# Patient Record
Sex: FEMALE | Race: ASIAN
[De-identification: names, ages, dates, MRNs, and addresses within clinical notes are randomized per-mention and may not be internally consistent; named-entity substitution may affect disease eponyms.]

---

## 2018-03-01 ENCOUNTER — HOSPITAL ENCOUNTER (OUTPATIENT)
Dept: HOSPITAL 8 - RAD | Age: 73
Discharge: HOME | End: 2018-03-01
Attending: INTERNAL MEDICINE
Payer: COMMERCIAL

## 2018-03-01 DIAGNOSIS — R06.02: Primary | ICD-10-CM

## 2018-03-01 PROCEDURE — 78452 HT MUSCLE IMAGE SPECT MULT: CPT

## 2018-03-01 PROCEDURE — A9502 TC99M TETROFOSMIN: HCPCS

## 2018-03-01 PROCEDURE — 93017 CV STRESS TEST TRACING ONLY: CPT

## 2019-10-21 ENCOUNTER — HOSPITAL ENCOUNTER (INPATIENT)
Dept: HOSPITAL 8 - ED | Age: 74
LOS: 6 days | Discharge: HOME | DRG: 300 | End: 2019-10-27
Attending: INTERNAL MEDICINE | Admitting: INTERNAL MEDICINE
Payer: COMMERCIAL

## 2019-10-21 VITALS — BODY MASS INDEX: 28.78 KG/M2 | HEIGHT: 57 IN | WEIGHT: 133.38 LBS

## 2019-10-21 VITALS — DIASTOLIC BLOOD PRESSURE: 74 MMHG | SYSTOLIC BLOOD PRESSURE: 125 MMHG

## 2019-10-21 DIAGNOSIS — Z82.3: ICD-10-CM

## 2019-10-21 DIAGNOSIS — L03.115: ICD-10-CM

## 2019-10-21 DIAGNOSIS — M81.0: ICD-10-CM

## 2019-10-21 DIAGNOSIS — D75.1: ICD-10-CM

## 2019-10-21 DIAGNOSIS — D75.89: ICD-10-CM

## 2019-10-21 DIAGNOSIS — I73.9: Primary | ICD-10-CM

## 2019-10-21 DIAGNOSIS — L97.919: ICD-10-CM

## 2019-10-21 DIAGNOSIS — I10: ICD-10-CM

## 2019-10-21 DIAGNOSIS — L97.929: ICD-10-CM

## 2019-10-21 DIAGNOSIS — I82.531: ICD-10-CM

## 2019-10-21 DIAGNOSIS — R00.0: ICD-10-CM

## 2019-10-21 DIAGNOSIS — Z87.442: ICD-10-CM

## 2019-10-21 DIAGNOSIS — Z88.8: ICD-10-CM

## 2019-10-21 LAB
ALBUMIN SERPL-MCNC: 3.3 G/DL (ref 3.4–5)
ALP SERPL-CCNC: 56 U/L (ref 45–117)
ALT SERPL-CCNC: 30 U/L (ref 12–78)
ANION GAP SERPL CALC-SCNC: 5 MMOL/L (ref 5–15)
BASOPHILS # BLD AUTO: 0.04 X10^3/UL (ref 0–0.1)
BASOPHILS NFR BLD AUTO: 1 % (ref 0–1)
BILIRUB SERPL-MCNC: 0.6 MG/DL (ref 0.2–1)
CALCIUM SERPL-MCNC: 9.5 MG/DL (ref 8.5–10.1)
CHLORIDE SERPL-SCNC: 104 MMOL/L (ref 98–107)
CREAT SERPL-MCNC: 0.95 MG/DL (ref 0.55–1.02)
EOSINOPHIL # BLD AUTO: 0.97 X10^3/UL (ref 0–0.4)
EOSINOPHIL NFR BLD AUTO: 11 % (ref 1–7)
ERYTHROCYTE [DISTWIDTH] IN BLOOD BY AUTOMATED COUNT: 13.9 % (ref 9.6–15.2)
LYMPHOCYTES # BLD AUTO: 1.33 X10^3/UL (ref 1–3.4)
LYMPHOCYTES NFR BLD AUTO: 15 % (ref 22–44)
MCH RBC QN AUTO: 39.6 PG (ref 27–34.8)
MCHC RBC AUTO-ENTMCNC: 33.2 G/DL (ref 32.4–35.8)
MCV RBC AUTO: 119 FL (ref 80–100)
MD: (no result)
MONOCYTES # BLD AUTO: 0.65 X10^3/UL (ref 0.2–0.8)
MONOCYTES NFR BLD AUTO: 8 % (ref 2–9)
NEUTROPHILS # BLD AUTO: 5.72 X10^3/UL (ref 1.8–6.8)
NEUTROPHILS NFR BLD AUTO: 66 % (ref 42–75)
PLATELET # BLD AUTO: 465 X10^3/UL (ref 130–400)
PMV BLD AUTO: 6.7 FL (ref 7.4–10.4)
PROT SERPL-MCNC: 7.8 G/DL (ref 6.4–8.2)
RBC # BLD AUTO: 3.34 X10^6/UL (ref 3.82–5.3)

## 2019-10-21 PROCEDURE — 87040 BLOOD CULTURE FOR BACTERIA: CPT

## 2019-10-21 PROCEDURE — 85025 COMPLETE CBC W/AUTO DIFF WBC: CPT

## 2019-10-21 PROCEDURE — 87186 SC STD MICRODIL/AGAR DIL: CPT

## 2019-10-21 PROCEDURE — A9575 INJ GADOTERATE MEGLUMI 0.1ML: HCPCS

## 2019-10-21 PROCEDURE — 87070 CULTURE OTHR SPECIMN AEROBIC: CPT

## 2019-10-21 PROCEDURE — 80053 COMPREHEN METABOLIC PANEL: CPT

## 2019-10-21 PROCEDURE — 83605 ASSAY OF LACTIC ACID: CPT

## 2019-10-21 PROCEDURE — 80048 BASIC METABOLIC PNL TOTAL CA: CPT

## 2019-10-21 PROCEDURE — 87205 SMEAR GRAM STAIN: CPT

## 2019-10-21 PROCEDURE — 93005 ELECTROCARDIOGRAM TRACING: CPT

## 2019-10-21 PROCEDURE — 93922 UPR/L XTREMITY ART 2 LEVELS: CPT

## 2019-10-21 PROCEDURE — 87077 CULTURE AEROBIC IDENTIFY: CPT

## 2019-10-21 PROCEDURE — 93970 EXTREMITY STUDY: CPT

## 2019-10-21 PROCEDURE — 99285 EMERGENCY DEPT VISIT HI MDM: CPT

## 2019-10-21 PROCEDURE — 36415 COLL VENOUS BLD VENIPUNCTURE: CPT

## 2019-10-21 PROCEDURE — 84145 PROCALCITONIN (PCT): CPT

## 2019-10-21 NOTE — NUR
PT WENT TO [facility] FOR [facility] IN [facility] X1 MONTH, PT WAS SENT HERE TO [facility] FOR POSSIBLE 
DVT. PER PT, NO TESTING WAS DONE. RASHES ON BILAT HEALS THAT ARE ITCHY AND SHE 
SCRATCHES THEM AND BECOMES OPEN AREAS.  COVERED WOUNDS, DRESSING CDI. 
CONNECTED TO ALL MONITORING. CALL LIGHT IN REACH. DAUGHTER AT BEDSIDE.

## 2019-10-21 NOTE — NUR
BLOOD CULTURES X 2 DRAWN BY THIS RN. VENOUS LACTATE REQUESTED FROM PROVIDER. 
PROVIDER AGGREABLE TO THIS. PT HAD PERIPHERAL STICKS X 2 AT THIS TIME. PT 
REPORTING PAIN ONLY SLIGHTLY BETTER. ABX TO BE STARTED AFTER CULTURES.

## 2019-10-22 VITALS — SYSTOLIC BLOOD PRESSURE: 127 MMHG | DIASTOLIC BLOOD PRESSURE: 73 MMHG

## 2019-10-22 VITALS — SYSTOLIC BLOOD PRESSURE: 116 MMHG | DIASTOLIC BLOOD PRESSURE: 71 MMHG

## 2019-10-22 VITALS — DIASTOLIC BLOOD PRESSURE: 68 MMHG | SYSTOLIC BLOOD PRESSURE: 109 MMHG

## 2019-10-22 VITALS — DIASTOLIC BLOOD PRESSURE: 74 MMHG | SYSTOLIC BLOOD PRESSURE: 125 MMHG

## 2019-10-22 RX ADMIN — AMPICILLIN SODIUM AND SULBACTAM SODIUM SCH MLS/HR: 2; 1 INJECTION, POWDER, FOR SOLUTION INTRAMUSCULAR; INTRAVENOUS at 23:22

## 2019-10-22 RX ADMIN — AMPICILLIN SODIUM AND SULBACTAM SODIUM SCH MLS/HR: 2; 1 INJECTION, POWDER, FOR SOLUTION INTRAMUSCULAR; INTRAVENOUS at 05:05

## 2019-10-22 RX ADMIN — AMPICILLIN SODIUM AND SULBACTAM SODIUM SCH MLS/HR: 2; 1 INJECTION, POWDER, FOR SOLUTION INTRAMUSCULAR; INTRAVENOUS at 16:56

## 2019-10-22 RX ADMIN — GABAPENTIN PRN MG: 300 CAPSULE ORAL at 16:56

## 2019-10-22 RX ADMIN — AMPICILLIN SODIUM AND SULBACTAM SODIUM SCH MLS/HR: 2; 1 INJECTION, POWDER, FOR SOLUTION INTRAMUSCULAR; INTRAVENOUS at 11:21

## 2019-10-22 RX ADMIN — ENOXAPARIN SODIUM SCH MG: 40 INJECTION SUBCUTANEOUS at 01:12

## 2019-10-22 RX ADMIN — ATORVASTATIN CALCIUM SCH MG: 20 TABLET, FILM COATED ORAL at 20:12

## 2019-10-23 VITALS — SYSTOLIC BLOOD PRESSURE: 116 MMHG | DIASTOLIC BLOOD PRESSURE: 68 MMHG

## 2019-10-23 VITALS — SYSTOLIC BLOOD PRESSURE: 117 MMHG | DIASTOLIC BLOOD PRESSURE: 77 MMHG

## 2019-10-23 VITALS — SYSTOLIC BLOOD PRESSURE: 121 MMHG | DIASTOLIC BLOOD PRESSURE: 79 MMHG

## 2019-10-23 VITALS — SYSTOLIC BLOOD PRESSURE: 122 MMHG | DIASTOLIC BLOOD PRESSURE: 69 MMHG

## 2019-10-23 VITALS — SYSTOLIC BLOOD PRESSURE: 130 MMHG | DIASTOLIC BLOOD PRESSURE: 75 MMHG

## 2019-10-23 LAB
<PLATELET ESTIMATE>: (no result)
<PLT MORPHOLOGY>: (no result)
ANION GAP SERPL CALC-SCNC: 4 MMOL/L (ref 5–15)
BASOPHILS NFR BLD MANUAL: 2 % (ref 0–1)
BASOS#(MANUAL): 0.17 X10^3/UL (ref 0–0.1)
CALCIUM SERPL-MCNC: 8.3 MG/DL (ref 8.5–10.1)
CHLORIDE SERPL-SCNC: 107 MMOL/L (ref 98–107)
CREAT SERPL-MCNC: 0.98 MG/DL (ref 0.55–1.02)
EOS#(MANUAL): 1.62 X10^3/UL (ref 0–0.4)
EOS% (MANUAL): 19 % (ref 1–7)
ERYTHROCYTE [DISTWIDTH] IN BLOOD BY AUTOMATED COUNT: 13.8 % (ref 9.6–15.2)
LYMPH#(MANUAL): 0.68 X10^3/UL (ref 1–3.4)
LYMPHS% (MANUAL): 8 % (ref 22–44)
MACROCYTES BLD QL SMEAR: (no result)
MCH RBC QN AUTO: 38.9 PG (ref 27–34.8)
MCHC RBC AUTO-ENTMCNC: 32.5 G/DL (ref 32.4–35.8)
MCV RBC AUTO: 119.7 FL (ref 80–100)
MD: YES
MONOS#(MANUAL): 0.51 X10^3/UL (ref 0.3–2.7)
MONOS% (MANUAL): 6 % (ref 2–9)
PLATELET # BLD AUTO: 482 X10^3/UL (ref 130–400)
PMV BLD AUTO: 6.3 FL (ref 7.4–10.4)
RBC # BLD AUTO: 3.33 X10^6/UL (ref 3.82–5.3)
SEG#(MANUAL): 5.53 X10^3/UL (ref 1.8–6.8)
SEGS% (MANUAL): 65 % (ref 42–75)

## 2019-10-23 RX ADMIN — AMPICILLIN SODIUM AND SULBACTAM SODIUM SCH MLS/HR: 2; 1 INJECTION, POWDER, FOR SOLUTION INTRAMUSCULAR; INTRAVENOUS at 11:15

## 2019-10-23 RX ADMIN — CLOPIDOGREL SCH MG: 75 TABLET, FILM COATED ORAL at 09:54

## 2019-10-23 RX ADMIN — GABAPENTIN PRN MG: 300 CAPSULE ORAL at 16:24

## 2019-10-23 RX ADMIN — GABAPENTIN PRN MG: 300 CAPSULE ORAL at 01:08

## 2019-10-23 RX ADMIN — ENOXAPARIN SODIUM SCH MG: 40 INJECTION SUBCUTANEOUS at 01:09

## 2019-10-23 RX ADMIN — AMPICILLIN SODIUM AND SULBACTAM SODIUM SCH MLS/HR: 2; 1 INJECTION, POWDER, FOR SOLUTION INTRAMUSCULAR; INTRAVENOUS at 16:24

## 2019-10-23 RX ADMIN — HYDROXYUREA SCH MG: 500 CAPSULE ORAL at 10:00

## 2019-10-23 RX ADMIN — GABAPENTIN PRN MG: 300 CAPSULE ORAL at 09:54

## 2019-10-23 RX ADMIN — AMPICILLIN SODIUM AND SULBACTAM SODIUM SCH MLS/HR: 2; 1 INJECTION, POWDER, FOR SOLUTION INTRAMUSCULAR; INTRAVENOUS at 23:17

## 2019-10-23 RX ADMIN — ACETAMINOPHEN PRN MG: 325 TABLET, FILM COATED ORAL at 14:26

## 2019-10-23 RX ADMIN — VITAMIN D, TAB 1000IU (100/BT) SCH UNITS: 25 TAB at 09:54

## 2019-10-23 RX ADMIN — KETOROLAC TROMETHAMINE PRN MG: 30 INJECTION, SOLUTION INTRAMUSCULAR at 20:40

## 2019-10-23 RX ADMIN — AMLODIPINE BESYLATE SCH MG: 5 TABLET ORAL at 09:54

## 2019-10-23 RX ADMIN — ATORVASTATIN CALCIUM SCH MG: 20 TABLET, FILM COATED ORAL at 20:52

## 2019-10-23 RX ADMIN — AMPICILLIN SODIUM AND SULBACTAM SODIUM SCH MLS/HR: 2; 1 INJECTION, POWDER, FOR SOLUTION INTRAMUSCULAR; INTRAVENOUS at 05:24

## 2019-10-24 VITALS — SYSTOLIC BLOOD PRESSURE: 115 MMHG | DIASTOLIC BLOOD PRESSURE: 52 MMHG

## 2019-10-24 VITALS — DIASTOLIC BLOOD PRESSURE: 73 MMHG | SYSTOLIC BLOOD PRESSURE: 117 MMHG

## 2019-10-24 VITALS — SYSTOLIC BLOOD PRESSURE: 119 MMHG | DIASTOLIC BLOOD PRESSURE: 45 MMHG

## 2019-10-24 VITALS — DIASTOLIC BLOOD PRESSURE: 64 MMHG | SYSTOLIC BLOOD PRESSURE: 120 MMHG

## 2019-10-24 LAB
ANION GAP SERPL CALC-SCNC: 3 MMOL/L (ref 5–15)
BASOPHILS # BLD AUTO: 0.05 X10^3/UL (ref 0–0.1)
BASOPHILS NFR BLD AUTO: 1 % (ref 0–1)
CALCIUM SERPL-MCNC: 7.9 MG/DL (ref 8.5–10.1)
CHLORIDE SERPL-SCNC: 110 MMOL/L (ref 98–107)
CREAT SERPL-MCNC: 1.28 MG/DL (ref 0.55–1.02)
EOSINOPHIL # BLD AUTO: 1.32 X10^3/UL (ref 0–0.4)
EOSINOPHIL NFR BLD AUTO: 18 % (ref 1–7)
ERYTHROCYTE [DISTWIDTH] IN BLOOD BY AUTOMATED COUNT: 13.6 % (ref 9.6–15.2)
LYMPHOCYTES # BLD AUTO: 1.12 X10^3/UL (ref 1–3.4)
LYMPHOCYTES NFR BLD AUTO: 16 % (ref 22–44)
MCH RBC QN AUTO: 38.8 PG (ref 27–34.8)
MCHC RBC AUTO-ENTMCNC: 32.3 G/DL (ref 32.4–35.8)
MCV RBC AUTO: 120.2 FL (ref 80–100)
MD: (no result)
MONOCYTES # BLD AUTO: 0.54 X10^3/UL (ref 0.2–0.8)
MONOCYTES NFR BLD AUTO: 8 % (ref 2–9)
NEUTROPHILS # BLD AUTO: 4.17 X10^3/UL (ref 1.8–6.8)
NEUTROPHILS NFR BLD AUTO: 58 % (ref 42–75)
PLATELET # BLD AUTO: 457 X10^3/UL (ref 130–400)
PMV BLD AUTO: 6.7 FL (ref 7.4–10.4)
RBC # BLD AUTO: 3.24 X10^6/UL (ref 3.82–5.3)

## 2019-10-24 RX ADMIN — HYDROXYUREA SCH MG: 500 CAPSULE ORAL at 09:23

## 2019-10-24 RX ADMIN — GABAPENTIN PRN MG: 300 CAPSULE ORAL at 18:08

## 2019-10-24 RX ADMIN — AMPICILLIN SODIUM AND SULBACTAM SODIUM SCH MLS/HR: 2; 1 INJECTION, POWDER, FOR SOLUTION INTRAMUSCULAR; INTRAVENOUS at 23:06

## 2019-10-24 RX ADMIN — DIPHENHYDRAMINE HYDROCHLORIDE, ZINC ACETATE PRN APPLIC: 2; .1 CREAM TOPICAL at 18:08

## 2019-10-24 RX ADMIN — AMPICILLIN SODIUM AND SULBACTAM SODIUM SCH MLS/HR: 2; 1 INJECTION, POWDER, FOR SOLUTION INTRAMUSCULAR; INTRAVENOUS at 05:20

## 2019-10-24 RX ADMIN — HYDROCORTISONE PRN APPLIC: 10 CREAM TOPICAL at 09:24

## 2019-10-24 RX ADMIN — HYDROCORTISONE PRN APPLIC: 10 CREAM TOPICAL at 18:08

## 2019-10-24 RX ADMIN — HYDROCORTISONE PRN APPLIC: 10 CREAM TOPICAL at 23:23

## 2019-10-24 RX ADMIN — CLOPIDOGREL SCH MG: 75 TABLET, FILM COATED ORAL at 09:24

## 2019-10-24 RX ADMIN — VITAMIN D, TAB 1000IU (100/BT) SCH UNITS: 25 TAB at 09:24

## 2019-10-24 RX ADMIN — AMLODIPINE BESYLATE SCH MG: 5 TABLET ORAL at 09:24

## 2019-10-24 RX ADMIN — GABAPENTIN PRN MG: 300 CAPSULE ORAL at 09:24

## 2019-10-24 RX ADMIN — GABAPENTIN PRN MG: 300 CAPSULE ORAL at 01:16

## 2019-10-24 RX ADMIN — ACETAMINOPHEN PRN MG: 325 TABLET, FILM COATED ORAL at 20:28

## 2019-10-24 RX ADMIN — DIPHENHYDRAMINE HYDROCHLORIDE, ZINC ACETATE PRN APPLIC: 2; .1 CREAM TOPICAL at 09:24

## 2019-10-24 RX ADMIN — LINEZOLID SCH MLS/HR: 600 INJECTION, SOLUTION INTRAVENOUS at 18:08

## 2019-10-24 RX ADMIN — AMPICILLIN SODIUM AND SULBACTAM SODIUM SCH MLS/HR: 2; 1 INJECTION, POWDER, FOR SOLUTION INTRAMUSCULAR; INTRAVENOUS at 11:35

## 2019-10-24 RX ADMIN — ATORVASTATIN CALCIUM SCH MG: 20 TABLET, FILM COATED ORAL at 20:28

## 2019-10-24 RX ADMIN — DIPHENHYDRAMINE HYDROCHLORIDE, ZINC ACETATE PRN APPLIC: 2; .1 CREAM TOPICAL at 23:23

## 2019-10-24 RX ADMIN — ENOXAPARIN SODIUM SCH MG: 40 INJECTION SUBCUTANEOUS at 01:10

## 2019-10-25 VITALS — SYSTOLIC BLOOD PRESSURE: 122 MMHG | DIASTOLIC BLOOD PRESSURE: 72 MMHG

## 2019-10-25 VITALS — SYSTOLIC BLOOD PRESSURE: 136 MMHG | DIASTOLIC BLOOD PRESSURE: 82 MMHG

## 2019-10-25 VITALS — SYSTOLIC BLOOD PRESSURE: 113 MMHG | DIASTOLIC BLOOD PRESSURE: 63 MMHG

## 2019-10-25 VITALS — SYSTOLIC BLOOD PRESSURE: 127 MMHG | DIASTOLIC BLOOD PRESSURE: 76 MMHG

## 2019-10-25 LAB
ANION GAP SERPL CALC-SCNC: 5 MMOL/L (ref 5–15)
BASOPHILS # BLD AUTO: 0.01 X10^3/UL (ref 0–0.1)
BASOPHILS NFR BLD AUTO: 0 % (ref 0–1)
CALCIUM SERPL-MCNC: 8.5 MG/DL (ref 8.5–10.1)
CHLORIDE SERPL-SCNC: 107 MMOL/L (ref 98–107)
CREAT SERPL-MCNC: 1.02 MG/DL (ref 0.55–1.02)
EOSINOPHIL # BLD AUTO: 1.15 X10^3/UL (ref 0–0.4)
EOSINOPHIL NFR BLD AUTO: 16 % (ref 1–7)
ERYTHROCYTE [DISTWIDTH] IN BLOOD BY AUTOMATED COUNT: 13.7 % (ref 9.6–15.2)
LYMPHOCYTES # BLD AUTO: 0.82 X10^3/UL (ref 1–3.4)
LYMPHOCYTES NFR BLD AUTO: 11 % (ref 22–44)
MCH RBC QN AUTO: 39.1 PG (ref 27–34.8)
MCHC RBC AUTO-ENTMCNC: 32.6 G/DL (ref 32.4–35.8)
MCV RBC AUTO: 119.7 FL (ref 80–100)
MD: (no result)
MONOCYTES # BLD AUTO: 0.45 X10^3/UL (ref 0.2–0.8)
MONOCYTES NFR BLD AUTO: 6 % (ref 2–9)
NEUTROPHILS # BLD AUTO: 4.79 X10^3/UL (ref 1.8–6.8)
NEUTROPHILS NFR BLD AUTO: 66 % (ref 42–75)
PLATELET # BLD AUTO: 506 X10^3/UL (ref 130–400)
PMV BLD AUTO: 6.4 FL (ref 7.4–10.4)
RBC # BLD AUTO: 3.34 X10^6/UL (ref 3.82–5.3)

## 2019-10-25 RX ADMIN — DIPHENHYDRAMINE HYDROCHLORIDE, ZINC ACETATE PRN APPLIC: 2; .1 CREAM TOPICAL at 20:25

## 2019-10-25 RX ADMIN — GABAPENTIN PRN MG: 300 CAPSULE ORAL at 17:33

## 2019-10-25 RX ADMIN — AMLODIPINE BESYLATE SCH MG: 5 TABLET ORAL at 08:08

## 2019-10-25 RX ADMIN — HYDROXYUREA SCH MG: 500 CAPSULE ORAL at 08:08

## 2019-10-25 RX ADMIN — ATORVASTATIN CALCIUM SCH MG: 20 TABLET, FILM COATED ORAL at 20:25

## 2019-10-25 RX ADMIN — CLOPIDOGREL SCH MG: 75 TABLET, FILM COATED ORAL at 08:08

## 2019-10-25 RX ADMIN — AMPICILLIN SODIUM AND SULBACTAM SODIUM SCH MLS/HR: 2; 1 INJECTION, POWDER, FOR SOLUTION INTRAMUSCULAR; INTRAVENOUS at 12:42

## 2019-10-25 RX ADMIN — HYDROCORTISONE PRN APPLIC: 10 CREAM TOPICAL at 06:08

## 2019-10-25 RX ADMIN — AMPICILLIN SODIUM AND SULBACTAM SODIUM SCH MLS/HR: 2; 1 INJECTION, POWDER, FOR SOLUTION INTRAMUSCULAR; INTRAVENOUS at 20:25

## 2019-10-25 RX ADMIN — VITAMIN D, TAB 1000IU (100/BT) SCH UNITS: 25 TAB at 08:08

## 2019-10-25 RX ADMIN — DIPHENHYDRAMINE HYDROCHLORIDE, ZINC ACETATE PRN APPLIC: 2; .1 CREAM TOPICAL at 06:08

## 2019-10-25 RX ADMIN — GABAPENTIN PRN MG: 300 CAPSULE ORAL at 01:13

## 2019-10-25 RX ADMIN — ENOXAPARIN SODIUM SCH MG: 30 INJECTION SUBCUTANEOUS at 01:15

## 2019-10-25 RX ADMIN — LINEZOLID SCH MLS/HR: 600 INJECTION, SOLUTION INTRAVENOUS at 06:02

## 2019-10-25 RX ADMIN — HYDROCORTISONE PRN APPLIC: 10 CREAM TOPICAL at 20:25

## 2019-10-26 VITALS — SYSTOLIC BLOOD PRESSURE: 101 MMHG | DIASTOLIC BLOOD PRESSURE: 62 MMHG

## 2019-10-26 VITALS — DIASTOLIC BLOOD PRESSURE: 67 MMHG | SYSTOLIC BLOOD PRESSURE: 111 MMHG

## 2019-10-26 VITALS — DIASTOLIC BLOOD PRESSURE: 71 MMHG | SYSTOLIC BLOOD PRESSURE: 129 MMHG

## 2019-10-26 VITALS — DIASTOLIC BLOOD PRESSURE: 72 MMHG | SYSTOLIC BLOOD PRESSURE: 138 MMHG

## 2019-10-26 RX ADMIN — ENOXAPARIN SODIUM SCH MG: 30 INJECTION SUBCUTANEOUS at 08:08

## 2019-10-26 RX ADMIN — AMPICILLIN SODIUM AND SULBACTAM SODIUM SCH MLS/HR: 2; 1 INJECTION, POWDER, FOR SOLUTION INTRAMUSCULAR; INTRAVENOUS at 12:42

## 2019-10-26 RX ADMIN — HYDROXYUREA SCH MG: 500 CAPSULE ORAL at 08:07

## 2019-10-26 RX ADMIN — AMPICILLIN SODIUM AND SULBACTAM SODIUM SCH MLS/HR: 2; 1 INJECTION, POWDER, FOR SOLUTION INTRAMUSCULAR; INTRAVENOUS at 04:02

## 2019-10-26 RX ADMIN — DIPHENHYDRAMINE HYDROCHLORIDE, ZINC ACETATE PRN APPLIC: 2; .1 CREAM TOPICAL at 20:26

## 2019-10-26 RX ADMIN — AMPICILLIN SODIUM AND SULBACTAM SODIUM SCH MLS/HR: 2; 1 INJECTION, POWDER, FOR SOLUTION INTRAMUSCULAR; INTRAVENOUS at 20:25

## 2019-10-26 RX ADMIN — HYDROCORTISONE PRN APPLIC: 10 CREAM TOPICAL at 05:02

## 2019-10-26 RX ADMIN — HYDROCORTISONE PRN APPLIC: 10 CREAM TOPICAL at 20:26

## 2019-10-26 RX ADMIN — VITAMIN D, TAB 1000IU (100/BT) SCH UNITS: 25 TAB at 08:08

## 2019-10-26 RX ADMIN — AMLODIPINE BESYLATE SCH MG: 5 TABLET ORAL at 08:08

## 2019-10-26 RX ADMIN — ATORVASTATIN CALCIUM SCH MG: 20 TABLET, FILM COATED ORAL at 20:25

## 2019-10-26 RX ADMIN — CLOPIDOGREL SCH MG: 75 TABLET, FILM COATED ORAL at 08:07

## 2019-10-26 RX ADMIN — KETOROLAC TROMETHAMINE PRN MG: 30 INJECTION, SOLUTION INTRAMUSCULAR at 05:08

## 2019-10-27 VITALS — SYSTOLIC BLOOD PRESSURE: 111 MMHG | DIASTOLIC BLOOD PRESSURE: 66 MMHG

## 2019-10-27 VITALS — SYSTOLIC BLOOD PRESSURE: 123 MMHG | DIASTOLIC BLOOD PRESSURE: 75 MMHG

## 2019-10-27 VITALS — DIASTOLIC BLOOD PRESSURE: 66 MMHG | SYSTOLIC BLOOD PRESSURE: 104 MMHG

## 2019-10-27 LAB
ANION GAP SERPL CALC-SCNC: 4 MMOL/L (ref 5–15)
BASOPHILS # BLD AUTO: 0.04 X10^3/UL (ref 0–0.1)
BASOPHILS NFR BLD AUTO: 1 % (ref 0–1)
CALCIUM SERPL-MCNC: 8.3 MG/DL (ref 8.5–10.1)
CHLORIDE SERPL-SCNC: 108 MMOL/L (ref 98–107)
CREAT SERPL-MCNC: 1.03 MG/DL (ref 0.55–1.02)
EOSINOPHIL # BLD AUTO: 1.23 X10^3/UL (ref 0–0.4)
EOSINOPHIL NFR BLD AUTO: 18 % (ref 1–7)
ERYTHROCYTE [DISTWIDTH] IN BLOOD BY AUTOMATED COUNT: 13.9 % (ref 9.6–15.2)
LYMPHOCYTES # BLD AUTO: 1.09 X10^3/UL (ref 1–3.4)
LYMPHOCYTES NFR BLD AUTO: 16 % (ref 22–44)
MCH RBC QN AUTO: 39.4 PG (ref 27–34.8)
MCHC RBC AUTO-ENTMCNC: 32.8 G/DL (ref 32.4–35.8)
MCV RBC AUTO: 120.2 FL (ref 80–100)
MD: (no result)
MONOCYTES # BLD AUTO: 0.59 X10^3/UL (ref 0.2–0.8)
MONOCYTES NFR BLD AUTO: 9 % (ref 2–9)
NEUTROPHILS # BLD AUTO: 3.73 X10^3/UL (ref 1.8–6.8)
NEUTROPHILS NFR BLD AUTO: 56 % (ref 42–75)
PLATELET # BLD AUTO: 483 X10^3/UL (ref 130–400)
PMV BLD AUTO: 6.9 FL (ref 7.4–10.4)
RBC # BLD AUTO: 3.21 X10^6/UL (ref 3.82–5.3)

## 2019-10-27 RX ADMIN — DIPHENHYDRAMINE HYDROCHLORIDE, ZINC ACETATE PRN APPLIC: 2; .1 CREAM TOPICAL at 08:56

## 2019-10-27 RX ADMIN — GABAPENTIN PRN MG: 300 CAPSULE ORAL at 16:51

## 2019-10-27 RX ADMIN — AMLODIPINE BESYLATE SCH MG: 5 TABLET ORAL at 08:55

## 2019-10-27 RX ADMIN — HYDROXYUREA SCH MG: 500 CAPSULE ORAL at 08:52

## 2019-10-27 RX ADMIN — HYDROCORTISONE PRN APPLIC: 10 CREAM TOPICAL at 08:56

## 2019-10-27 RX ADMIN — AMPICILLIN SODIUM AND SULBACTAM SODIUM SCH MLS/HR: 2; 1 INJECTION, POWDER, FOR SOLUTION INTRAMUSCULAR; INTRAVENOUS at 04:19

## 2019-10-27 RX ADMIN — CLOPIDOGREL SCH MG: 75 TABLET, FILM COATED ORAL at 08:55

## 2019-10-27 RX ADMIN — VITAMIN D, TAB 1000IU (100/BT) SCH UNITS: 25 TAB at 08:55

## 2020-02-03 ENCOUNTER — HOSPITAL ENCOUNTER (OUTPATIENT)
Dept: HOSPITAL 8 - WOUND | Age: 75
Discharge: HOME | End: 2020-02-03
Attending: INTERNAL MEDICINE
Payer: COMMERCIAL

## 2020-02-03 DIAGNOSIS — I73.9: ICD-10-CM

## 2020-02-03 DIAGNOSIS — L97.212: ICD-10-CM

## 2020-02-03 DIAGNOSIS — L97.222: ICD-10-CM

## 2020-02-03 DIAGNOSIS — I10: ICD-10-CM

## 2020-02-03 DIAGNOSIS — E78.5: ICD-10-CM

## 2020-02-03 DIAGNOSIS — I87.333: Primary | ICD-10-CM

## 2020-02-03 PROCEDURE — 99215 OFFICE O/P EST HI 40 MIN: CPT

## 2020-02-17 ENCOUNTER — HOSPITAL ENCOUNTER (OUTPATIENT)
Dept: HOSPITAL 8 - WOUND | Age: 75
Discharge: HOME | End: 2020-02-17
Attending: INTERNAL MEDICINE
Payer: COMMERCIAL

## 2020-02-17 DIAGNOSIS — I73.9: ICD-10-CM

## 2020-02-17 DIAGNOSIS — I10: ICD-10-CM

## 2020-02-17 DIAGNOSIS — I87.333: Primary | ICD-10-CM

## 2020-02-17 DIAGNOSIS — L97.222: ICD-10-CM

## 2020-02-17 DIAGNOSIS — L97.212: ICD-10-CM

## 2020-02-17 DIAGNOSIS — E78.5: ICD-10-CM

## 2020-02-17 PROCEDURE — 97597 DBRDMT OPN WND 1ST 20 CM/<: CPT

## 2020-03-09 ENCOUNTER — HOSPITAL ENCOUNTER (OUTPATIENT)
Dept: HOSPITAL 8 - WOUND | Age: 75
Discharge: HOME | End: 2020-03-09
Attending: INTERNAL MEDICINE
Payer: COMMERCIAL

## 2020-03-09 DIAGNOSIS — L97.222: ICD-10-CM

## 2020-03-09 DIAGNOSIS — E78.5: ICD-10-CM

## 2020-03-09 DIAGNOSIS — I73.9: ICD-10-CM

## 2020-03-09 DIAGNOSIS — I87.333: Primary | ICD-10-CM

## 2020-03-09 DIAGNOSIS — L97.212: ICD-10-CM

## 2020-03-09 DIAGNOSIS — I10: ICD-10-CM

## 2020-03-09 PROCEDURE — 99214 OFFICE O/P EST MOD 30 MIN: CPT

## 2020-09-17 ENCOUNTER — HOSPITAL ENCOUNTER (OUTPATIENT)
Dept: HOSPITAL 8 - CFH | Age: 75
Discharge: HOME | End: 2020-09-17
Attending: INTERNAL MEDICINE
Payer: COMMERCIAL

## 2020-09-17 DIAGNOSIS — Z79.899: ICD-10-CM

## 2020-09-17 DIAGNOSIS — D47.3: ICD-10-CM

## 2020-09-17 DIAGNOSIS — D75.1: Primary | ICD-10-CM

## 2020-09-17 DIAGNOSIS — D72.829: ICD-10-CM

## 2020-09-17 DIAGNOSIS — Z79.01: ICD-10-CM

## 2020-09-17 LAB
ALBUMIN SERPL-MCNC: 3.3 G/DL (ref 3.4–5)
ALP SERPL-CCNC: 52 U/L (ref 45–117)
ALT SERPL-CCNC: 42 U/L (ref 12–78)
ANION GAP SERPL CALC-SCNC: 4 MMOL/L (ref 5–15)
BASOPHILS # BLD AUTO: 0.03 X10^3/UL (ref 0–0.1)
BASOPHILS NFR BLD AUTO: 1 % (ref 0–1)
BILIRUB SERPL-MCNC: 0.7 MG/DL (ref 0.2–1)
CALCIUM SERPL-MCNC: 9 MG/DL (ref 8.5–10.1)
CHLORIDE SERPL-SCNC: 103 MMOL/L (ref 98–107)
CREAT SERPL-MCNC: 0.94 MG/DL (ref 0.55–1.02)
EOSINOPHIL # BLD AUTO: 0.21 X10^3/UL (ref 0–0.4)
EOSINOPHIL NFR BLD AUTO: 4 % (ref 1–7)
ERYTHROCYTE [DISTWIDTH] IN BLOOD BY AUTOMATED COUNT: 17.1 % (ref 9.6–15.2)
LYMPHOCYTES # BLD AUTO: 1.12 X10^3/UL (ref 1–3.4)
LYMPHOCYTES NFR BLD AUTO: 21 % (ref 22–44)
MCH RBC QN AUTO: 39.8 PG (ref 27–34.8)
MCHC RBC AUTO-ENTMCNC: 32.8 G/DL (ref 32.4–35.8)
MCV RBC AUTO: 121.1 FL (ref 80–100)
MD: (no result)
MONOCYTES # BLD AUTO: 0.35 X10^3/UL (ref 0.2–0.8)
MONOCYTES NFR BLD AUTO: 7 % (ref 2–9)
NEUTROPHILS # BLD AUTO: 3.71 X10^3/UL (ref 1.8–6.8)
NEUTROPHILS NFR BLD AUTO: 69 % (ref 42–75)
PLATELET # BLD AUTO: 349 X10^3/UL (ref 130–400)
PMV BLD AUTO: 6.7 FL (ref 7.4–10.4)
PROT SERPL-MCNC: 7.6 G/DL (ref 6.4–8.2)
RBC # BLD AUTO: 3.57 X10^6/UL (ref 3.82–5.3)

## 2020-09-17 PROCEDURE — 86480 TB TEST CELL IMMUN MEASURE: CPT

## 2020-09-17 PROCEDURE — 36415 COLL VENOUS BLD VENIPUNCTURE: CPT

## 2020-09-17 PROCEDURE — 85025 COMPLETE CBC W/AUTO DIFF WBC: CPT

## 2020-09-17 PROCEDURE — 80053 COMPREHEN METABOLIC PANEL: CPT

## 2020-09-18 LAB — PF4 HEPARIN CMPLX AB SER-ACNC: 0.49 (ref 0–0)

## 2023-08-18 ENCOUNTER — NON-PROVIDER VISIT (OUTPATIENT)
Dept: WOUND CARE | Facility: MEDICAL CENTER | Age: 78
End: 2023-08-18
Payer: COMMERCIAL

## 2023-08-18 PROCEDURE — 99211 OFF/OP EST MAY X REQ PHY/QHP: CPT

## 2023-08-18 PROCEDURE — 97597 DBRDMT OPN WND 1ST 20 CM/<: CPT

## 2023-08-18 RX ORDER — VALSARTAN 160 MG/1
160 TABLET ORAL DAILY
COMMUNITY
Start: 2023-06-14

## 2023-08-18 RX ORDER — SPIRONOLACTONE 25 MG/1
TABLET ORAL
COMMUNITY
Start: 2023-07-05

## 2023-08-18 RX ORDER — CLOPIDOGREL BISULFATE 75 MG/1
TABLET ORAL
COMMUNITY
Start: 2023-06-14

## 2023-08-18 RX ORDER — CARVEDILOL 6.25 MG/1
TABLET ORAL
COMMUNITY
Start: 2023-08-02

## 2023-08-18 RX ORDER — ROSUVASTATIN CALCIUM 10 MG/1
10 TABLET, COATED ORAL DAILY
COMMUNITY

## 2023-08-18 RX ORDER — HYDROXYUREA 500 MG/1
CAPSULE ORAL
COMMUNITY
Start: 2023-07-25

## 2023-08-18 RX ORDER — ALENDRONATE SODIUM 70 MG/1
TABLET ORAL
COMMUNITY
Start: 2023-06-14

## 2023-08-18 NOTE — CERTIFICATION
"Non Provider Encounter- Full Thickness wound    HISTORY OF PRESENT ILLNESS  Wound History:    START OF CARE IN CLINIC: 08/18/2023    REFERRING PROVIDER: ROGER Wong (PCP at [facility])   WOUND- Full Thickness Wound   LOCATION: Left lateral ankle/malleolus.   HISTORY: Pt and daughter report wound has been present approximately 3 months, since May 2023.  Pt hit her ankle on her walker at home.  Previously she has been seen by [place] wound [facility], but not for this wound site.  Minimal medical history noted in Epic.  Daughter reports polycythemia, HTN, PAD, hx DVT currently on plavix, nonsmoker.    Pertinent Labs and Diagnostics:    Labs:     A1c: No results found for: \"HBA1C\"       IMAGING: none    VASCULAR STUDIES: none  VASCULAR ASSESSMENT: 8/18/23: In clinic: Bilateral DP/PT pulses not palpable manually.  Per doppler, left DP/PT weak biphasic and right DP/PT monophasic.  Pt denies leg pain or fatigue.  2 to 3+ pitting edema noted to BLEs.      LAST  WOUND CULTURE:  DATE : none             Patient allergies and medications reviewed via [facility].     Current Outpatient Medications:     alendronate (FOSAMAX) 70 MG Tab, TAKE 1 TABLET ORALLY ONCE A WEEK 30 MINUTES BEFORE THE FIRST FOOD, BEVERAGE OR MEDICINE OF THE DAY WITH PLAIN WATER FOR BONE HEALTH, Disp: , Rfl:     carvedilol (COREG) 6.25 MG Tab, TAKE 1.5 TABLETS BY MOUTH ONCE DAILY IN THE MORNING AND 1 TABLET IN THE EVENING WITH FOOD PER ASHOK SMITH, Disp: , Rfl:     Cholecalciferol 2000 UNIT Cap, Take  by mouth., Disp: , Rfl:     clopidogrel (PLAVIX) 75 MG Tab, TAKE 1 TABLET BY MOUTH ONCE DAILY (PER DR. ASHOK SMITH), Disp: , Rfl:     hydroxyurea (HYDREA) 500 MG Cap, TAKE 2 CAPSULES BY MOUTH ONCE DAILY EXCEPT TUESDAY AND SUNDAY TAKE 1 CAPSULE DAILY (PER DR. JOSSE BELL), Disp: , Rfl:     rosuvastatin (CRESTOR) 10 MG Tab, Take 10 mg by mouth every day., Disp: , Rfl:     spironolactone (ALDACTONE) 25 MG Tab, TAKE 12 TABLET ORALLY ONCE A DAY PER PER  ASHOK SMITH, " Disp: , Rfl:     valsartan (DIOVAN) 160 MG Tab, Take 160 mg by mouth every day., Disp: , Rfl:       Wound Assessment:    Wound 08/18/23 Full Thickness Wound Left lateral ankle/malleolus (Active)   Wound Image     08/18/23 0800   Site Assessment Pink;Red 08/18/23 0800   Periwound Assessment Intact;Edema;Hemosiderin Staining 08/18/23 0800   Margins Epibole (rolled edges) 08/18/23 0800   Closure Secondary intention 08/18/23 0800   Drainage Amount Moderate 08/18/23 0800   Drainage Description Serosanguineous 08/18/23 0800   Treatments Cleansed;Topical Lidocaine;CSWD - Conservative Sharp Wound Debridement 08/18/23 0800   Wound Cleansing Normal Saline Irrigation 08/18/23 0800   Periwound Protectant Skin Protectant Wipes to Periwound 08/18/23 0800   Dressing Changed New 08/18/23 0800   Dressing Cleansing/Solutions Normal Saline 08/18/23 0800   Dressing Options NS moistened collagen Dressing (tiffani);Hydrofiber Silver;Silicone Adhesive Foam;Tubigrip E x2 08/18/23 0800   Dressing Change/Treatment Frequency Every 72 hrs, and As Needed 08/18/23 0800   Wound Team Following Weekly 08/18/23 0800   Wound Length (cm) 0.3 cm 08/18/23 0800   Wound Width (cm) 0.5 cm 08/18/23 0800   Wound Depth (cm) 0.2 cm 08/18/23 0800   Wound Surface Area (cm^2) 0.15 cm^2 08/18/23 0800   Wound Volume (cm^3) 0.03 cm^3 08/18/23 0800   Post-Procedure Length (cm) 0.3 cm 08/18/23 0800   Post-Procedure Width (cm) 0.5 cm 08/18/23 0800   Post-Procedure Depth (cm) 0.3 cm 08/18/23 0800   Post-Procedure Surface Area (cm^2) 0.15 cm^2 08/18/23 0800   Post-Procedure Volume (cm^3) 0.045 cm^3 08/18/23 0800   Tunneling (cm) 0 cm 08/18/23 0800   Undermining (cm) 0 cm 08/18/23 0800   Wound Odor None 08/18/23 0800   Pulses See vascular assessment section above. 08/18/23 0800   Exposed Structures None 08/18/23 0800       Procedures:    -2% viscous lidocaine applied topically to wound bed for approximately 5 minutes prior to debridement  -Curette used to debride wound  bed. Entire surface of wound, 0.25cm2 debrided.  -Refer to flowsheet for wound care details.       PATIENT EDUCATION  -Advised to go to ER for any increased redness, swelling, drainage or odor, or if patient develops fever, chills, nausea or vomiting.  -Importance of adequate nutrition for wound healing  -Increase protein intake (unless contraindicated by renal status)  -Remove compression if s/sx decreased circulation noted, such as leg pain, cold/tingly toes, feeling of circulation being cut off.  -S/sx infection taught: wound redness, swelling, increased pain, foul odor, purulent drainage, fever, feeling poorly and to seek treatment if noted.

## 2023-08-18 NOTE — PATIENT INSTRUCTIONS
-Keep your wound dressing clean, dry, and intact.    -Change your dressing every 72 hours and if it becomes soiled, soaked, or falls off.    -Should you experience any significant changes in your wound(s), such as infection (redness, swelling, localized heat, increased pain, fever > 101 F, chills) or have any questions regarding your home care instructions, please contact the wound center at [phone]. If after hours, contact your primary care physician or go to the hospital emergency room.

## 2023-08-25 ENCOUNTER — OFFICE VISIT (OUTPATIENT)
Dept: WOUND CARE | Facility: MEDICAL CENTER | Age: 78
End: 2023-08-25
Payer: COMMERCIAL

## 2023-08-25 VITALS
OXYGEN SATURATION: 96 % | HEART RATE: 75 BPM | SYSTOLIC BLOOD PRESSURE: 120 MMHG | DIASTOLIC BLOOD PRESSURE: 69 MMHG | RESPIRATION RATE: 16 BRPM | TEMPERATURE: 97.9 F

## 2023-08-25 DIAGNOSIS — L08.9 WOUND INFECTION: ICD-10-CM

## 2023-08-25 DIAGNOSIS — L97.329 VENOUS ULCER OF ANKLE, LEFT (HCC): Primary | ICD-10-CM

## 2023-08-25 DIAGNOSIS — I83.023 VENOUS ULCER OF ANKLE, LEFT (HCC): Primary | ICD-10-CM

## 2023-08-25 DIAGNOSIS — Z86.718 HISTORY OF DVT (DEEP VEIN THROMBOSIS): ICD-10-CM

## 2023-08-25 DIAGNOSIS — T14.8XXA WOUND INFECTION: ICD-10-CM

## 2023-08-25 DIAGNOSIS — R60.0 EDEMA OF BOTH LOWER LEGS: ICD-10-CM

## 2023-08-25 PROCEDURE — 99213 OFFICE O/P EST LOW 20 MIN: CPT | Mod: 25 | Performed by: NURSE PRACTITIONER

## 2023-08-25 PROCEDURE — 99213 OFFICE O/P EST LOW 20 MIN: CPT

## 2023-08-25 PROCEDURE — 3078F DIAST BP <80 MM HG: CPT | Performed by: NURSE PRACTITIONER

## 2023-08-25 PROCEDURE — 3074F SYST BP LT 130 MM HG: CPT | Performed by: NURSE PRACTITIONER

## 2023-08-25 PROCEDURE — 11042 DBRDMT SUBQ TIS 1ST 20SQCM/<: CPT | Performed by: NURSE PRACTITIONER

## 2023-08-25 PROCEDURE — 11042 DBRDMT SUBQ TIS 1ST 20SQCM/<: CPT

## 2023-08-25 RX ORDER — DOXYCYCLINE HYCLATE 100 MG
100 TABLET ORAL 2 TIMES DAILY
Qty: 14 TABLET | Refills: 0 | Status: SHIPPED | OUTPATIENT
Start: 2023-08-25 | End: 2023-09-01

## 2023-08-25 ASSESSMENT — ENCOUNTER SYMPTOMS
DIARRHEA: 0
CHILLS: 0
SHORTNESS OF BREATH: 0
FEVER: 0
CONSTIPATION: 0
VOMITING: 0
NAUSEA: 0
CLAUDICATION: 0
COUGH: 0

## 2023-08-25 NOTE — PROGRESS NOTES
Provider Encounter- Lower Extremity Ulcer      HISTORY OF PRESENT ILLNESS  Wound History:    START OF CARE IN CLINIC: 8/18/2023    REFERRING PROVIDER: Joy Walsh      WOUND ETIOLOGY: Venous   LOCATION: Left lateral malleolus   HISTORY: Patient's wound has been present since May of this year when she bumped her ankle on her walker.  S she and her daughter have been trying to treat this wound themselves but has not seen much improvement.  She has received treatment at [facility] [facility] in the past, but not for this wound.  She does take hydroxyurea for polycythemia.  This medication has been linked to chronic skin ulcers.    Pertinent Medical History: Polycythemia vera, PAD, DVT           TOBACCO USE: She has never smoked or use smokeless tobacco    Patient's problem list, allergies, and current medications reviewed and updated in Epic    Interval History:  8/25/2023 : Initial provider visit with ROGER Ramsey, DAYANNA-BC, SKYLERN, MISAEL.  Patient presents today accompanied by her daughter.  She states she is feeling well overall, having some pain from her wound.  She denies any claudication-like symptoms, pedal pulses are palpable.  She does have lower extremity edema, hemosiderin staining, scarring, and varicose veins which leads me to believe that etiology of this wound is chronic venous insufficiency.  Does have a history of DVT, and is on Plavix.   I was not able to do a thorough debridement today due to patient discomfort.  Periwound is erythemic.  Because a was not able to debride the wound thoroughly, not able to get a reliable culture.  Rx for doxycycline sent to patient's pharmacy.  If no improvement by next visit, will do a deeper debridement perhaps with use of subcutaneous lidocaine, and obtain a wound culture.      REVIEW OF SYSTEMS:   Review of Systems   Constitutional:  Negative for chills and fever.   Respiratory:  Negative for cough and shortness of breath.    Cardiovascular:  Positive  for leg swelling. Negative for chest pain and claudication.        Swelling in both legs for many years  Swelling in her left leg has been worse since her injury   Gastrointestinal:  Negative for constipation, diarrhea, nausea and vomiting.   Genitourinary:  Negative for dysuria.   Musculoskeletal:  Positive for joint pain.        Bad knees         PHYSICAL EXAMINATION:   /69   Pulse 75   Temp 36.6 °C (97.9 °F) (Temporal)   Resp 16   SpO2 96%     Physical Exam  Constitutional:       Comments: Elderly, frail female   Cardiovascular:      Rate and Rhythm: Normal rate.      Pulses: Normal pulses.      Comments: DP and PT pulses palpable bilaterally, 2+, biphasic by Doppler  Pulmonary:      Effort: Pulmonary effort is normal.   Musculoskeletal:      Right lower leg: Edema present.      Left lower leg: Edema present.      Comments: 2+ edema to right lower extremity  3+ edema to left lower extremity  Patient is slightly bowlegged   Skin:     Comments: Full-thickness ulcer to left lateral ankle-thick layer of slough to wound bed, minimal amount of serosanguineous drainage, periwound erythema radiating 5 to 6 cm around wound, no odor   Neurological:      Mental Status: She is alert.       WOUND ASSESSMENT  Wound 08/18/23 Full Thickness Wound Left lateral ankle/malleolus (Active)   Wound Image    08/25/23 0800   Site Assessment Red;Yellow;Slough 08/25/23 0800   Periwound Assessment Hemosiderin Staining;Edema;Flaky;Fragile 08/25/23 0800   Margins Epibole (rolled edges) 08/25/23 0800   Closure Secondary intention 08/18/23 0800   Drainage Amount Moderate 08/25/23 0800   Drainage Description Serosanguineous 08/25/23 0800   Treatments Topical Lidocaine;Cleansed;Provider debridement;Site care 08/25/23 0800   Wound Cleansing Foam Cleanser/Washcloth 08/25/23 0800   Periwound Protectant Skin Moisturizer;Skin Protectant Wipes to Periwound 08/25/23 0800   Dressing Status Old drainage 08/25/23 0800   Dressing Changed Changed  "08/25/23 0800   Dressing Cleansing/Solutions Not Applicable 08/25/23 0800   Dressing Options Honey Gel;Hydrofiber;Nonadhesive Foam;Hypafix Tape;Tubigrip 08/25/23 0800   Dressing Change/Treatment Frequency Every 72 hrs, and As Needed 08/25/23 0800   Wound Team Following Weekly 08/25/23 0800   Wound Length (cm) 0.4 cm 08/25/23 0800   Wound Width (cm) 0.5 cm 08/25/23 0800   Wound Depth (cm) 0.2 cm 08/25/23 0800   Wound Surface Area (cm^2) 0.2 cm^2 08/25/23 0800   Wound Volume (cm^3) 0.04 cm^3 08/25/23 0800   Post-Procedure Length (cm) 0.6 cm 08/25/23 0800   Post-Procedure Width (cm) 0.5 cm 08/25/23 0800   Post-Procedure Depth (cm) 0.2 cm 08/25/23 0800   Post-Procedure Surface Area (cm^2) 0.3 cm^2 08/25/23 0800   Post-Procedure Volume (cm^3) 0.06 cm^3 08/25/23 0800   Wound Healing % -33 08/25/23 0800   Tunneling (cm) 0 cm 08/25/23 0800   Undermining (cm) 0 cm 08/25/23 0800   Wound Odor None 08/25/23 0800   Pulses Other (Comments) 08/18/23 0800   Exposed Structures None 08/25/23 0800   Number of days: 7           PROCEDURE:   -2% viscous lidocaine applied topically to wound bed for approximately 5 minutes prior to debridement  -Curette used to excise nonviable tissue from wound bed.  Excisional debridement was performed to remove devitalized tissue until healthy, bleeding tissue was visualized.   Entire surface of wound, 0.3 cm²  debrided.  Tissue debrided into the subcutaneous layer.  Unable to establish understandingly wound bed due to patient discomfort.  -Bleeding controlled with manual pressure.   -Wound care completed by wound RN, refer to wound flowsheet   -Patient tolerated the procedure well, without c/o of significant pain or discomfort.       Pertinent Labs and Diagnostics:    Labs:   A1c: No results found for: \"HBA1C\"         IMAGING: No pertinent imaging found in epic    VASCULAR STUDIES:   No results found.    LAST  WOUND CULTURE:  DATE :  No results found for: \"CULTRSULT\"           ASSESSMENT AND PLAN: "     1. Venous ulcer of ankle, left (HCC)  Comments: Ulcer to lateral ankle caused by patient bumping into her walker.  Wound started in May of this year has not shown much progress.    8/25/2023: Full-thickness ulcer with thick layer of slough to wound bed.  Patient experienced severe pain with attempted debridement  -Excisional debridement of wound in clinic today, medically necessary to promote wound healing.  Unable to establish 100% clean wound bed due to patient discomfort  -Patient to return to clinic weekly for assessment and debridement  -Patient to change dressing 1-2 times per week in between clinic visits   -Referral to home health.  However, may not be covered by her insurance.  -Venous duplex with reflux study ordered    Wound care: Medical honey gel to promote autolytic debridement, management of bioburden, and to maintain moist wound environment,  foam cover dressing, Hypafix tape to secure dressing, Tubigrip to manage edema    2. Edema of both lower legs    8/25/2023: Patient has edema to both lower extremities.  Left lower extremity significantly more edematous than the right.  Per patient, left lower leg has been more swollen since she injured her ankle.    -Tubigrip to manage edema.  Consider increasing compression level if wound fails to progress      3. History of DVT (deep vein thrombosis)    8/25/2023: Patient has a history of DVT, but uncertain where.  She is on Plavix.  -Venous duplex study with reflux already ordered, should be able to evaluate for DVT as well    4.  Wound infection    8/25/2023: Patient presents with significant periwound erythema and tenderness to her wound.  Unfortunately, was not able to get a clean wound bed with debridement due to her pain.  Patient has CKD stage III  -Rx for doxycycline sent to patient's for pharmacy            PATIENT EDUCATION  - Etiology of  venous stasis ulceration discussed with patient  - Importance of managing edema for healing of ulcer, and  for prevention of new ulcer development  -Need for lifelong compression of lower legs   -Elevate legs above the level of the heart periodically throughout the day.  -Advised to go to ER for any increased redness, swelling, drainage or odor, or if patient develops fever, chills, nausea or vomiting.      My total time spent caring for the patient on the day of the encounter was 20 minutes.   This does not include time spent on separately billable procedures/tests.       Please note that this note may have been created using voice recognition software. I have worked with technical experts from [employer] to optimize the interface.  I have made every reasonable attempt to correct obvious errors, but there may be errors of grammar and possibly     N

## 2023-08-25 NOTE — PATIENT INSTRUCTIONS
-Keep your wound dressing clean, dry, and intact.    -Change your dressing if it becomes soiled, soaked, or falls off.    -Should you experience any significant changes in your wound(s), such as infection (redness, swelling, localized heat, increased pain, fever > 101 F, chills) or have any questions regarding your home care instructions, please contact the wound center at [phone]. If after hours, contact your primary care physician or go to the hospital emergency room.

## 2023-09-01 ENCOUNTER — NON-PROVIDER VISIT (OUTPATIENT)
Dept: WOUND CARE | Facility: MEDICAL CENTER | Age: 78
End: 2023-09-01
Payer: COMMERCIAL

## 2023-09-01 PROCEDURE — 97602 WOUND(S) CARE NON-SELECTIVE: CPT

## 2023-09-01 NOTE — PATIENT INSTRUCTIONS
Avoid prolonged standing or sitting without elevating your legs.  - Apply tubigrip to your legs ending 2 fingers below back of knee without wrinkles.     Apply honey gel to wound bed as directed by provider. Cover wound with Aquacel and change dressing every 2 days. Secure dressing with dry gauze and tape.    Should you experience any significant changes in your wound(s), such as infection (redness, swelling, localized heat, increased pain, fever > 101 F, chills) or have any questions regarding your home care instructions, please contact the wound center at [phone]. If after hours, contact your primary care physician or go to the hospital emergency room.   Keep dressing clean, dry and covered while bathing. Only change dressing if it becomes over saturated, soiled or falls off.

## 2023-09-01 NOTE — PROGRESS NOTES
Pt reports venous study is scheduled for 11/17/23.    Also reports she could not tolerate tubi E and could not sleep with it on so she took it off. Discussed using size F for this week. Instructed pt to remove it if it is too painful but try to elevate LE then put tubigrip back on.    Nonselective debridement with NS and gauze to remove nonviable tissue from wound bed.

## 2023-09-08 ENCOUNTER — NON-PROVIDER VISIT (OUTPATIENT)
Dept: WOUND CARE | Facility: MEDICAL CENTER | Age: 78
End: 2023-09-08
Payer: COMMERCIAL

## 2023-09-08 PROCEDURE — 97602 WOUND(S) CARE NON-SELECTIVE: CPT

## 2023-09-08 NOTE — PATIENT INSTRUCTIONS
Avoid prolonged standing or sitting without elevating your legs.  - Apply tubigrip to your legs ending 2 fingers below back of knee without wrinkles.       Should you experience any significant changes in your wound(s), such as infection (redness, swelling, localized heat, increased pain, fever > 101 F, chills) or have any questions regarding your home care instructions, please contact the wound center at [phone]. If after hours, contact your primary care physician or go to the hospital emergency room.   Keep dressing clean, dry and covered while bathing. Only change dressing if it becomes over saturated, soiled or falls off.

## 2023-09-08 NOTE — PROCEDURES
Pt still having lot of pain to wound. Deferred sharp debridement.  Nonselective debridement with NS and gauze to remove nonviable tissue from wound bed.     Discussed increasing compression. Pt states she was okay with one layer of tubi F. Advised to increase to double layers of tubi F and pt is willing to try. Instructed pt to remove 1 layer if it is too tight but to reapply it back on once she feels better. Pt agreeable.

## 2023-09-15 ENCOUNTER — NON-PROVIDER VISIT (OUTPATIENT)
Dept: WOUND CARE | Facility: MEDICAL CENTER | Age: 78
End: 2023-09-15
Payer: COMMERCIAL

## 2023-09-15 NOTE — PROGRESS NOTES
"Patient's daughter called to cancel as she is going to see \"her other Dr. and they will determine if they think pt should continue wound care or not\".   "

## 2023-11-17 ENCOUNTER — HOSPITAL ENCOUNTER (OUTPATIENT)
Dept: RADIOLOGY | Facility: MEDICAL CENTER | Age: 78
End: 2023-11-17
Attending: NURSE PRACTITIONER
Payer: MEDICARE

## 2023-11-17 DIAGNOSIS — L97.329 VENOUS ULCER OF ANKLE, LEFT (HCC): ICD-10-CM

## 2023-11-17 DIAGNOSIS — I83.023 VENOUS ULCER OF ANKLE, LEFT (HCC): ICD-10-CM

## 2023-11-17 PROCEDURE — 93971 EXTREMITY STUDY: CPT | Mod: 26,LT | Performed by: INTERNAL MEDICINE

## 2023-11-17 PROCEDURE — 93971 EXTREMITY STUDY: CPT | Mod: LT

## 2023-11-29 ENCOUNTER — PATIENT MESSAGE (OUTPATIENT)
Dept: HEALTH INFORMATION MANAGEMENT | Facility: OTHER | Age: 78
End: 2023-11-29